# Patient Record
Sex: FEMALE | Race: OTHER | NOT HISPANIC OR LATINO | URBAN - METROPOLITAN AREA
[De-identification: names, ages, dates, MRNs, and addresses within clinical notes are randomized per-mention and may not be internally consistent; named-entity substitution may affect disease eponyms.]

---

## 2020-07-30 ENCOUNTER — EMERGENCY (EMERGENCY)
Facility: HOSPITAL | Age: 30
LOS: 1 days | Discharge: HOME | End: 2020-07-30
Admitting: EMERGENCY MEDICINE
Payer: COMMERCIAL

## 2020-07-30 VITALS
SYSTOLIC BLOOD PRESSURE: 103 MMHG | DIASTOLIC BLOOD PRESSURE: 69 MMHG | HEIGHT: 63 IN | HEART RATE: 78 BPM | WEIGHT: 121.25 LBS | TEMPERATURE: 99 F | RESPIRATION RATE: 18 BRPM | OXYGEN SATURATION: 97 %

## 2020-07-30 DIAGNOSIS — Z23 ENCOUNTER FOR IMMUNIZATION: ICD-10-CM

## 2020-07-30 DIAGNOSIS — S60.811A ABRASION OF RIGHT WRIST, INITIAL ENCOUNTER: ICD-10-CM
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
## 2020-07-30 DIAGNOSIS — S00.83XA CONTUSION OF OTHER PART OF HEAD, INITIAL ENCOUNTER: ICD-10-CM
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
## 2020-07-30 DIAGNOSIS — S00.81XA ABRASION OF OTHER PART OF HEAD, INITIAL ENCOUNTER: ICD-10-CM

## 2020-07-30 DIAGNOSIS — S50.311A ABRASION OF RIGHT ELBOW, INITIAL ENCOUNTER: ICD-10-CM

## 2020-07-30 PROCEDURE — 73110 X-RAY EXAM OF WRIST: CPT | Mod: 26,RT

## 2020-07-30 PROCEDURE — 73130 X-RAY EXAM OF HAND: CPT | Mod: 26,RT

## 2020-07-30 PROCEDURE — 73100 X-RAY EXAM OF WRIST: CPT | Mod: 26,RT

## 2020-07-30 PROCEDURE — 73080 X-RAY EXAM OF ELBOW: CPT | Mod: 26,RT

## 2020-07-30 PROCEDURE — 99285 EMERGENCY DEPT VISIT HI MDM: CPT | Mod: 25

## 2020-07-30 PROCEDURE — 70486 CT MAXILLOFACIAL W/O DYE: CPT | Mod: 26

## 2020-07-30 RX ORDER — TETANUS TOXOID, REDUCED DIPHTHERIA TOXOID AND ACELLULAR PERTUSSIS VACCINE, ADSORBED 5; 2.5; 8; 8; 2.5 [IU]/.5ML; [IU]/.5ML; UG/.5ML; UG/.5ML; UG/.5ML
0.5 SUSPENSION INTRAMUSCULAR ONCE
Refills: 0 | Status: COMPLETED | OUTPATIENT
Start: 2020-07-30 | End: 2020-07-30

## 2020-07-30 RX ORDER — IBUPROFEN 200 MG
800 TABLET ORAL ONCE
Refills: 0 | Status: COMPLETED | OUTPATIENT
Start: 2020-07-30 | End: 2020-07-30

## 2020-07-30 RX ADMIN — Medication 800 MILLIGRAM(S): at 13:38

## 2020-07-30 RX ADMIN — TETANUS TOXOID, REDUCED DIPHTHERIA TOXOID AND ACELLULAR PERTUSSIS VACCINE, ADSORBED 0.5 MILLILITER(S): 5; 2.5; 8; 8; 2.5 SUSPENSION INTRAMUSCULAR at 13:38

## 2020-07-30 NOTE — ED ADULT NURSE NOTE - NSIMPLEMENTINTERV_GEN_ALL_ED
Implemented All Fall Risk Interventions:  [place] to call system. Call bell, personal items and telephone within reach. Instruct patient to call for assistance. Room bathroom lighting operational. Non-slip footwear when patient is off stretcher. Physically safe environment: no spills, clutter or unnecessary equipment. Stretcher in lowest position, wheels locked, appropriate side rails in place. Provide visual cue, wrist band, yellow gown, etc. Monitor gait and stability. Monitor for mental status changes and reorient to person, place, and time. Review medications for side effects contributing to fall risk. Reinforce activity limits and safety measures with patient and family.
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
## 2020-07-30 NOTE — ED PROVIDER NOTE - NSFOLLOWUPINSTRUCTIONS_ED_ALL_ED_FT
Contusion  A contusion is a deep bruise. This is a result of an injury that causes bleeding under the skin. Symptoms of bruising include pain, swelling, and discolored skin. The skin may turn blue, purple, or yellow.  Follow these instructions at home:  Managing pain, stiffness, and swelling     You may use RICE. This stands for:  Resting.Icing.Compression, or putting pressure.Elevating, or raising the injured area.To follow this method, do these actions:  Rest the injured area.If told, put ice on the injured area.  Put ice in a plastic bag.Place a towel between your skin and the bag.Leave the ice on for 20 minutes, 2–3 times per day.If told, put light pressure (compression) on the injured area using an elastic bandage. Make sure the bandage is not too tight. If the area tingles or becomes numb, remove it and put it back on as told by your doctor.If possible, raise (elevate) the injured area above the level of your heart while you are sitting or lying down.General instructions     Take over-the-counter and prescription medicines only as told by your doctor.Keep all follow-up visits as told by your doctor. This is important.Contact a doctor if:  Your symptoms do not get better after several days of treatment.Your symptoms get worse.You have trouble moving the injured area.Get help right away if:  You have very bad pain.You have a loss of feeling (numbness) in a hand or foot.Your hand or foot turns pale or cold.Summary  A contusion is a deep bruise. This is a result of an injury that causes bleeding under the skin.Symptoms of bruising include pain, swelling, and discolored skin. The skin may turn blue, purple, or yellow.This condition is treated with rest, ice, compression, and elevation. This is also called RICE. You may be given over-the-counter medicines for pain.Contact a doctor if you do not feel better, or you feel worse. Get help right away if you have very bad pain, have lost feeling in a hand or foot, or the area turns pale or cold.This information is not intended to replace advice given to you by your health care provider. Make sure you discuss any questions you have with your health care provider.      Head Injury, Adult  There are many types of head injuries. Head injuries can be as minor as a bump, or they can be a serious medical issue. More severe head injuries include:  A jarring injury to the brain (concussion).A bruise (contusion) of the brain. This means there is bleeding in the brain that can cause swelling.A cracked skull (skull fracture).Bleeding in the brain that collects, clots, and forms a bump (hematoma).After a head injury, most problems occur within the first 24 hours, but side effects may occur up to 7–10 days after the injury. It is important to watch your condition for any changes. You may need to be observed in the emergency department or urgent care, or you may be admitted to the hospital.  What are the causes?  There are many possible causes of a head injury. A serious head injury may be caused by a car accident, bicycle or motorcycle accidents, sports injuries, and falls.  What are the symptoms?  Symptoms of a head injury include a contusion, bump, or bleeding at the site of the injury. Other physical symptoms may include:  Headache.Nausea or vomiting.Dizziness.Feeling tired.Being uncomfortable around bright lights or loud noises.Seizures.Trouble being awakened.Fainting.Mental or emotional symptoms may include:  Irritability.Confusion and memory problems.Poor attention and concentration.Changes in eating or sleeping habits.Anxiety or depression.How is this diagnosed?  This condition can usually be diagnosed based on your symptoms, a description of the injury, and a physical exam. You may also have imaging tests done, such as a CT scan or MRI.  How is this treated?  Treatment for this condition depends on the severity and type of injury you have. The main goal of treatment is to prevent complications and to allow the brain time to heal.  Mild head injury     If you have a mild head injury, you may be sent home and treatment may include:  Observation. A responsible adult should stay with you for 24 hours after your injury and check on you often.Physical rest.Brain rest.Pain medicines.Severe head injury    If you have a severe head injury, treatment may include:  Close observation. This includes hospitalization with frequent physical exams.Medicines to relieve pain, prevent seizures, and decrease brain swelling.Breathing support. This may include using a ventilator.Treatments to manage the swelling inside the brain.Brain surgery. This may be needed to:  Remove a blood clot.Stop the bleeding.Remove a part of the skull to allow room for the brain to swell.Follow these instructions at home:  Activity     Rest and avoid activities that are physically hard or tiring.Make sure you get enough sleep.Limit activities that require a lot of thought or attention, such as:  Watching TV.Playing memory games and puzzles.Job-related work or homework.Working on the computer, using social media, and texting.Avoid activities that could cause another head injury, such as playing sports, until your health care provider approves. Having another head injury, especially before the first one has healed, can be dangerous.Ask your health care provider when it is safe for you to return to your regular activities, including work or school. Ask your health care provider for a step-by-step plan for gradually returning to activities.Ask your health care provider when you can drive, ride a bicycle, or use heavy machinery. Your ability to react may be slower after a brain injury. Do not do these activities if you are dizzy.Lifestyle        Do not drink alcohol until your health care provider approves. Do not use drugs. Alcohol and certain drugs may slow your recovery and can put you at risk of further injury.If it is harder than usual to remember things, write them down.If you are easily distracted, try to do one thing at a time.Talk with family members or close friends when making important decisions.Tell your friends, family, a trusted colleague, and  about your injury, symptoms, and restrictions. Have them watch for any new or worsening problems.General instructions     Take over-the-counter and prescription medicines only as told by your health care provider.Have someone stay with you for 24 hours after your head injury. This person should watch you for any changes in your symptoms and be ready to seek medical help.Keep all follow-up visits as told by your health care provider. This is important.How is this prevented?  Work on improving your balance and strength to avoid falls.Wear a seatbelt when you are in a moving vehicle.Wear a helmet when riding a bicycle, skiing, or doing any other sport or activity that has a risk of injury.If you drink alcohol:  Limit how much you use to:  0–1 drink a day for women.0–2 drinks a day for men.Be aware of how much alcohol is in your drink. In the [place] one drink equals one 12 oz bottle of beer (355 mL), one 5 oz glass of wine (148 mL), or one 1½ oz glass of hard liquor (44 mL).Take safety measures in your home, such as:  Removing clutter and tripping hazards from floors and stairways.Using grab bars in bathrooms and handrails by stairs.Placing non-slip mats on floors and in bathtubs.Improving lighting in dim areas.Get help right away if:  You have:  A severe headache that is not helped by medicine.Trouble walking or weakness in your arms and legs.Clear or bloody fluid coming from your nose or ears.Changes in your vision.A seizure.You lose your balance.You vomit.Your pupils change size.Your speech is slurred.Your dizziness gets worse.You faint.You are sleepier than normal and have trouble staying awake.Your symptoms get worse.These symptoms may represent a serious problem that is an emergency. Do not wait to see if the symptoms will go away. Get medical help right away. Call your local emergency services (911 in the [place] Do not drive yourself to the hospital.     Summary  Head injuries can be minor or they can be a serious medical issue requiring immediate attention.Treatment for this condition depends on the severity and type of injury you have.Ask your health care provider when it is safe for you to return to your regular activities, including work or school.Head injury prevention includes wearing a seat belt in a motor vehicle, using a helmet on a bicycle, limiting alcohol use, and taking safety measures in your home.This information is not intended to replace advice given to you by your health care provider. Make sure you discuss any questions you have with your health care provider.

## 2020-07-30 NOTE — ED PROVIDER NOTE - EYES, MLM
mild swelling over rt periorbital, mild ecchymosis over face, + ttp over area, no cervical spine tenderness, no hemotympanum,  Clear bilaterally, pupils equal, round and reactive to light, EOMI X 2, no pain with EOM X 2,
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
## 2020-07-30 NOTE — ED PROVIDER NOTE - MUSCULOSKELETAL, MLM
rt wrist : + abrasion ,otherwise FROm , no deformity , rt elbow: + small abrasion  otherwise FROM , no deformity, Spine appears normal, range of motion is not limited, no muscle or joint tenderness

## 2020-07-30 NOTE — ED ADULT NURSE REASSESSMENT NOTE - NS ED NURSE REASSESS COMMENT FT1
assuming care from BONNIE cordova. Patient resting in bed in NAD. oriented to person place and time. GCS=15, awaiting radiology results. will continue to assess.

## 2020-07-30 NOTE — ED PROVIDER NOTE - DIAGNOSTIC INTERPRETATION
Radiology Interpretation performed by ED CORRINA Rizvi   R Elbow AP + Lateral + Oblique x-ray  R Hand x-ray, 3 views  R Wrist x-ray, 2 views  No fracture, no dislocation (joint spaces grossly normal), no Foreign Body noted, soft tissue normal
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
## 2020-07-30 NOTE — ED PROVIDER NOTE - PATIENT PORTAL LINK FT
You can access the FollowMyHealth Patient Portal offered by [facility] by registering at the following website: [email or web address]. By joining [facility]’s FollowMyHealth portal, you will also be able to view your health information using other applications (apps) compatible with our system.

## 2020-07-30 NOTE — ED PROVIDER NOTE - CHPI ED SYMPTOMS NEG
no redness/no vomiting/no chills/no bleeding at site/no drainage/no fever/no bleeding/no red streaks/no purulent drainage